# Patient Record
Sex: FEMALE | Race: WHITE
[De-identification: names, ages, dates, MRNs, and addresses within clinical notes are randomized per-mention and may not be internally consistent; named-entity substitution may affect disease eponyms.]

---

## 2022-10-09 ENCOUNTER — HOSPITAL ENCOUNTER (EMERGENCY)
Dept: HOSPITAL 26 - MED | Age: 32
Discharge: HOME | End: 2022-10-09
Payer: COMMERCIAL

## 2022-10-09 VITALS — WEIGHT: 132 LBS | BODY MASS INDEX: 24.29 KG/M2 | HEIGHT: 62 IN

## 2022-10-09 VITALS — SYSTOLIC BLOOD PRESSURE: 112 MMHG | DIASTOLIC BLOOD PRESSURE: 76 MMHG

## 2022-10-09 VITALS — SYSTOLIC BLOOD PRESSURE: 114 MMHG | DIASTOLIC BLOOD PRESSURE: 78 MMHG

## 2022-10-09 DIAGNOSIS — F41.0: Primary | ICD-10-CM

## 2022-10-09 NOTE — NUR
Patient discharged with v/s stable. Written and verbal after care instructions 
given and explained. 

Patient verbalized understanding. Ambulatory with steady gait. All questions 
addressed prior to discharge. Advised to follow up with PMD in 3-4 days

## 2022-10-09 NOTE — NUR
c/o headache since earlier today, believes it relates to a med she started to 
take three days ago (Wellbutrin), no dizziness 

denies any nausea or vomiting